# Patient Record
Sex: MALE | Race: WHITE | NOT HISPANIC OR LATINO | Employment: FULL TIME | URBAN - METROPOLITAN AREA
[De-identification: names, ages, dates, MRNs, and addresses within clinical notes are randomized per-mention and may not be internally consistent; named-entity substitution may affect disease eponyms.]

---

## 2020-12-29 ENCOUNTER — IMMUNIZATIONS (OUTPATIENT)
Dept: FAMILY MEDICINE CLINIC | Facility: HOSPITAL | Age: 65
End: 2020-12-29

## 2020-12-29 DIAGNOSIS — Z23 ENCOUNTER FOR IMMUNIZATION: ICD-10-CM

## 2020-12-29 PROCEDURE — 0011A SARS-COV-2 / COVID-19 MRNA VACCINE (MODERNA) 100 MCG: CPT

## 2020-12-29 PROCEDURE — 91301 SARS-COV-2 / COVID-19 MRNA VACCINE (MODERNA) 100 MCG: CPT

## 2021-01-26 ENCOUNTER — IMMUNIZATIONS (OUTPATIENT)
Dept: FAMILY MEDICINE CLINIC | Facility: HOSPITAL | Age: 66
End: 2021-01-26

## 2021-01-26 DIAGNOSIS — Z23 ENCOUNTER FOR IMMUNIZATION: Primary | ICD-10-CM

## 2021-01-26 PROCEDURE — 0012A SARS-COV-2 / COVID-19 MRNA VACCINE (MODERNA) 100 MCG: CPT

## 2021-01-26 PROCEDURE — 91301 SARS-COV-2 / COVID-19 MRNA VACCINE (MODERNA) 100 MCG: CPT

## 2023-02-07 ENCOUNTER — HOSPITAL ENCOUNTER (OUTPATIENT)
Facility: HOSPITAL | Age: 68
End: 2023-02-07
Attending: INTERNAL MEDICINE | Admitting: INTERNAL MEDICINE

## 2023-06-13 ENCOUNTER — TELEPHONE (OUTPATIENT)
Dept: GASTROENTEROLOGY | Facility: CLINIC | Age: 68
End: 2023-06-13

## 2023-06-13 NOTE — TELEPHONE ENCOUNTER
Patients GI provider:  New Pt    Number to return call: [phone]    Reason for call: Pt has [employer] PPO ID # [id number]  Was unable to verify      Scheduled procedure/appointment date if applicable: Apt 0/1/40

## 2023-06-30 RX ORDER — PAROXETINE 10 MG/1
TABLET, FILM COATED ORAL
COMMUNITY
Start: 2023-04-24

## 2023-06-30 RX ORDER — TESTOSTERONE ENANTHATE 100 MG/.5ML
INJECTION SUBCUTANEOUS
COMMUNITY
Start: 2023-06-07

## 2023-06-30 RX ORDER — APIXABAN 5 MG/1
5 TABLET, FILM COATED ORAL 2 TIMES DAILY
COMMUNITY
Start: 2023-04-08

## 2023-06-30 RX ORDER — ALLOPURINOL 100 MG/1
100 TABLET ORAL 2 TIMES DAILY
COMMUNITY
Start: 2023-04-17

## 2023-06-30 RX ORDER — NEBIVOLOL 5 MG/1
2.5 TABLET ORAL DAILY
COMMUNITY
Start: 2023-04-10

## 2023-06-30 RX ORDER — VALSARTAN 40 MG/1
40 TABLET ORAL 2 TIMES DAILY
COMMUNITY
Start: 2023-04-08

## 2023-06-30 RX ORDER — ATOMOXETINE HCL 60 MG
60 CAPSULE ORAL DAILY
COMMUNITY
Start: 2023-06-01

## 2023-06-30 RX ORDER — BECLOMETHASONE DIPROPIONATE HFA 80 UG/1
2 AEROSOL, METERED RESPIRATORY (INHALATION) DAILY
COMMUNITY
Start: 2023-04-17

## 2023-06-30 RX ORDER — MONTELUKAST SODIUM 10 MG/1
10 TABLET ORAL EVERY EVENING
COMMUNITY
Start: 2023-05-28

## 2023-07-03 ENCOUNTER — OFFICE VISIT (OUTPATIENT)
Dept: GASTROENTEROLOGY | Facility: CLINIC | Age: 68
End: 2023-07-03
Payer: COMMERCIAL

## 2023-07-03 ENCOUNTER — TELEPHONE (OUTPATIENT)
Dept: GASTROENTEROLOGY | Facility: CLINIC | Age: 68
End: 2023-07-03

## 2023-07-03 VITALS
DIASTOLIC BLOOD PRESSURE: 76 MMHG | HEIGHT: 76 IN | SYSTOLIC BLOOD PRESSURE: 118 MMHG | WEIGHT: 270 LBS | BODY MASS INDEX: 32.88 KG/M2

## 2023-07-03 DIAGNOSIS — Z86.010 HISTORY OF COLON POLYPS: Primary | ICD-10-CM

## 2023-07-03 DIAGNOSIS — R14.0 ABDOMINAL BLOATING: Primary | ICD-10-CM

## 2023-07-03 DIAGNOSIS — Z86.010 HISTORY OF COLON POLYPS: ICD-10-CM

## 2023-07-03 DIAGNOSIS — K59.00 CONSTIPATION, UNSPECIFIED CONSTIPATION TYPE: ICD-10-CM

## 2023-07-03 DIAGNOSIS — R10.12 LEFT UPPER QUADRANT ABDOMINAL PAIN: ICD-10-CM

## 2023-07-03 PROCEDURE — 99204 OFFICE O/P NEW MOD 45 MIN: CPT | Performed by: INTERNAL MEDICINE

## 2023-07-03 RX ORDER — SODIUM PICOSULFATE, MAGNESIUM OXIDE, AND ANHYDROUS CITRIC ACID 12; 3.5; 1 G/175ML; G/175ML; MG/175ML
LIQUID ORAL
Qty: 350 ML | Refills: 0 | Status: SHIPPED | OUTPATIENT
Start: 2023-07-03

## 2023-07-03 RX ORDER — POLYETHYLENE GLYCOL 3350, SODIUM SULFATE ANHYDROUS, SODIUM BICARBONATE, SODIUM CHLORIDE, POTASSIUM CHLORIDE 236; 22.74; 6.74; 5.86; 2.97 G/4L; G/4L; G/4L; G/4L; G/4L
4000 POWDER, FOR SOLUTION ORAL ONCE
Qty: 4000 ML | Refills: 0 | Status: SHIPPED | OUTPATIENT
Start: 2023-07-03 | End: 2023-07-03

## 2023-07-03 NOTE — TELEPHONE ENCOUNTER
Patient calling office in regards to the procedure prep. States that the [place] he had is  and is asking for a prescription to be sent to his pharmacy. no concerns

## 2023-07-03 NOTE — TELEPHONE ENCOUNTER
Scheduled date of colonoscopy (as of today):9/18/23  Physician performing colonoscopy:SAMSON  Location of colonoscopy:[facility]  Bowel prep reviewed with patient:Clenpiq  Instructions reviewed with patient by:JESSICA  Clearances: Angelo Burris

## 2023-07-03 NOTE — PROGRESS NOTES
[facility] - Outpatient Consultation  Felicitas Jones 74 y.o. male MRN: [id number]  Encounter: [id number]    ASSESSMENT AND PLAN:      1. Abdominal bloating  2. Left upper quadrant abdominal pain  New onset of early satiety and significant abdominal bloating concerned about peptic ulcer disease, gastritis, rule out H. pylori. Appears unrelated to any lower GI symptoms and doubt recurrence of underlying colitis. · Diet as tolerated  · Continue omeprazole  · Plan EGD  - EGD; Future    3. Constipation, unspecified constipation type  4. History of colon polyps  Patient has history of bloody stools and diagnosis of ulcerative colitis from teens through 25s. Appears to have resolved on its own. Has had periodic surveillance colonoscopies and adenomatous polyps have been found but no report of ulcerative colitis. Given change in bowels and history of colonic neoplasia we will plan colonoscopy at the time of his EGD.  - Colonoscopy; Future  - polyethylene glycol (Golytely) 4000 mL solution; Take 4,000 mL by mouth once for 1 dose Take 4000 mL by mouth once for 1 dose. Use as directed  Dispense: 4000 mL; Refill: 0      Followup Appointment: 3 to 4 months pending results of studies  ______________________________________________________________________    Chief Complaint   Patient presents with   • Bloated     Early satiety, full for long time, distention       HPI:   Felicitas Jones is a 74y.o. year old male who presents with bloatind, dyspepsia and change in stools. Diagnosed with UC as a teen, frequent blood stools. ?treated with Rx and resolved by 30years old? Has had surveillance colonoscopies, Has hx of polyps and 5 years surveillance. Has early satiety and bloating. LUQ pain. Not really early satiety, but feels uncomfortable and has bloating. Ongoing for six months. Also constipated past six months. Stools has mucus, no blood. No weight loss. No new medications. On Eliquis due to [place]. And DVT's diagnosed year and a half ago. Has reflux, no dysphagia.         Historical Information   Past Medical History:   Diagnosis Date   • AAA (abdominal aortic aneurysm) ([address])    • Asthma    • Diverticulitis of colon    • Hypertension    • Pulmonary embolism (HCC)    • Ulcerative colitis ([address])      Past Surgical History:   Procedure Laterality Date   • REPLACEMENT TOTAL KNEE Right    • TONSILLECTOMY     • TRIGGER FINGER RELEASE Bilateral      Social History     Substance and Sexual Activity   Alcohol Use Never     Social History     Substance and Sexual Activity   Drug Use Never     Social History     Tobacco Use   Smoking Status Former   • Types: Cigarettes   • Start date:    • Quit date:    • Years since quittin.5   Smokeless Tobacco Never     Family History   Problem Relation Age of Onset   • Colon polyps Mother    • Colon polyps Father    • Colon cancer Maternal Uncle    • Colon cancer Paternal Aunt        Meds/Allergies     Current Outpatient Medications:   •  allopurinol (ZYLOPRIM) 100 mg tablet  •  Eliquis 5 MG  •  montelukast (SINGULAIR) 10 mg tablet  •  nebivolol (BYSTOLIC) 5 mg tablet  •  PARoxetine (PAXIL) 10 mg tablet  •  polyethylene glycol (Golytely) 4000 mL solution  •  Qvar RediHaler 80 MCG/ACT inhaler  •  Strattera 60 MG capsule  •  valsartan (DIOVAN) 40 mg tablet  •  Xyosted 100 MG/0.5ML SOAJ    Allergies   Allergen Reactions   • Metronidazole Anaphylaxis and Rash     Severe SOB and CP     • Midazolam Anaphylaxis     Rapid irregular heart rate     • Penicillins Hives and Other (See Comments)   • Amoxicillin-Pot Clavulanate Hives   • Cephalexin Hives and Rash   • Iodinated Contrast Media Other (See Comments)   • Shellfish-Derivedproducts [Shellfish-Derived Products - Food Allergy] Other (See Comments)   • Sulfa Antibiotics Other (See Comments)   • Moxifloxacin Rash and Other (See Comments)       PHYSICAL EXAM:    Blood pressure 118/76, height 6' 4" (1.93 m), weight 122 kg (270 lb). Body mass index is 32.87 kg/m². General Appearance: NAD, cooperative, alert  Eyes: Anicteric, PERRLA, EOMI  ENT:  Normocephalic, atraumatic, normal mucosa. Neck:  Supple, symmetrical, trachea midline,   Resp:  Clear to auscultation bilaterally; no rales, rhonchi or wheezing; respirations unlabored   CV:  S1 S2, Regular rate and rhythm; no murmur, rub, or gallop. GI:  Soft, non-tender, non-distended; normal bowel sounds; no masses, no organomegaly   Rectal: Deferred  Musculoskeletal: No cyanosis, clubbing or edema. Normal ROM. Skin:  No jaundice, rashes, or lesions   Heme/Lymph: No palpable cervical lymphadenopathy  Psych: Normal affect, good eye contact  Neuro: No gross deficits, AAOx3    Lab Results:   No results found for: "WBC", "HGB", "HCT", "MCV", "PLT"  No results found for: "NA", "K", "CL", "CO2", "ANIONGAP", "BUN", "CREATININE", "GLUCOSE", "GLUF", "CALCIUM", "CORRECTEDCA", "AST", "ALT", "ALKPHOS", "PROT", "BILITOT", "EGFR"  No results found for: "IRON", "TIBC", "FERRITIN"  No results found for: "LIPASE"    Radiology Results:   No results found. REVIEW OF SYSTEMS:    CONSTITUTIONAL: Denies any fever, chills, rigors, and weight loss. HEENT: No earache or tinnitus. Denies hearing loss or visual disturbances. CARDIOVASCULAR: No chest pain or palpitations. RESPIRATORY: Denies any cough, hemoptysis, shortness of breath or dyspnea on exertion. GASTROINTESTINAL: As noted in the History of Present Illness. GENITOURINARY: No problems with urination. Denies any hematuria or dysuria. NEUROLOGIC: No dizziness or vertigo, denies headaches. MUSCULOSKELETAL: Denies any muscle or joint pain. SKIN: Denies skin rashes or itching. ENDOCRINE: Denies excessive thirst. Denies intolerance to heat or cold. PSYCHOSOCIAL: Denies depression or anxiety. Denies any recent memory loss.    Answers for HPI/ROS submitted by the patient on 7/1/2023  Chronicity: chronic  Onset: more than 1 month ago  Onset quality: gradual  Frequency: intermittently  Progression since onset: waxing and waning  Pain location: LLQ  Pain - numeric: 6/10  Pain quality: sharp  Radiates to: LUQ  anorexia: Yes  arthralgias: No  belching: Yes  constipation: Yes  diarrhea: No  dysuria: No  fever: No  flatus: Yes  frequency: No  headaches: No  hematochezia: No  hematuria: No  melena: No  myalgias: No  nausea:  No  weight loss: No  vomiting: No  Aggravated by: certain positions  Relieved by: palpation

## 2023-07-03 NOTE — LETTER
July 3, 2023     Jami Darby, [address]    Patient: Andrew Milan   YOB: 1955   Date of Visit: 7/3/2023       Dear Dr. Jocy Crowell: Thank you for referring Jamal Duran to me for evaluation. Below are my notes for this consultation. If you have questions, please do not hesitate to call me. I look forward to following your patient along with you. Sincerely,        Jane Bryant MD        CC: No Recipients    Jane Bryant MD  7/3/2023  5:53 PM  Sign when Signing Visit    [address] [facility] - Outpatient Consultation  Andrew Milna 74 y.o. male MRN: [id number]  Encounter: [id number]    ASSESSMENT AND PLAN:      1. Abdominal bloating  2. Left upper quadrant abdominal pain  New onset of early satiety and significant abdominal bloating concerned about peptic ulcer disease, gastritis, rule out H. pylori. Appears unrelated to any lower GI symptoms and doubt recurrence of underlying colitis. Diet as tolerated  Continue omeprazole  Plan EGD  - EGD; Future    3. Constipation, unspecified constipation type  4. History of colon polyps  Patient has history of bloody stools and diagnosis of ulcerative colitis from teens through 25s. Appears to have resolved on its own. Has had periodic surveillance colonoscopies and adenomatous polyps have been found but no report of ulcerative colitis. Given change in bowels and history of colonic neoplasia we will plan colonoscopy at the time of his EGD.  - Colonoscopy; Future  - polyethylene glycol (Golytely) 4000 mL solution; Take 4,000 mL by mouth once for 1 dose Take 4000 mL by mouth once for 1 dose.  Use as directed  Dispense: 4000 mL; Refill: 0      Followup Appointment: 3 to 4 months pending results of studies  ______________________________________________________________________    Chief Complaint   Patient presents with   • Bloated     Early satiety, full for long time, distention HPI:   Sruthi Garcia is a 74y.o. year old male who presents with bloatind, dyspepsia and change in stools. Diagnosed with UC as a teen, frequent blood stools. ?treated with Rx and resolved by 30years old? Has had surveillance colonoscopies, Has hx of polyps and 5 years surveillance. Has early satiety and bloating. LUQ pain. Not really early satiety, but feels uncomfortable and has bloating. Ongoing for six months. Also constipated past six months. Stools has mucus, no blood. No weight loss. No new medications. On Eliquis due to [place]. And DVT's diagnosed year and a half ago. Has reflux, no dysphagia.         Historical Information   Past Medical History:   Diagnosis Date   • AAA (abdominal aortic aneurysm) ([address])    • Asthma    • Diverticulitis of colon    • Hypertension    • Pulmonary embolism (HCC)    • Ulcerative colitis ([address])      Past Surgical History:   Procedure Laterality Date   • REPLACEMENT TOTAL KNEE Right    • TONSILLECTOMY     • TRIGGER FINGER RELEASE Bilateral      Social History     Substance and Sexual Activity   Alcohol Use Never     Social History     Substance and Sexual Activity   Drug Use Never     Social History     Tobacco Use   Smoking Status Former   • Types: Cigarettes   • Start date:    • Quit date:    • Years since quittin.5   Smokeless Tobacco Never     Family History   Problem Relation Age of Onset   • Colon polyps Mother    • Colon polyps Father    • Colon cancer Maternal Uncle    • Colon cancer Paternal Aunt        Meds/Allergies     Current Outpatient Medications:   •  allopurinol (ZYLOPRIM) 100 mg tablet  •  Eliquis 5 MG  •  montelukast (SINGULAIR) 10 mg tablet  •  nebivolol (BYSTOLIC) 5 mg tablet  •  PARoxetine (PAXIL) 10 mg tablet  •  polyethylene glycol (Golytely) 4000 mL solution  •  Qvar RediHaler 80 MCG/ACT inhaler  •  Strattera 60 MG capsule  •  valsartan (DIOVAN) 40 mg tablet  •  Xyosted 100 MG/0.5ML SOAJ    Allergies   Allergen Reactions • Metronidazole Anaphylaxis and Rash     Severe SOB and CP     • Midazolam Anaphylaxis     Rapid irregular heart rate     • Penicillins Hives and Other (See Comments)   • Amoxicillin-Pot Clavulanate Hives   • Cephalexin Hives and Rash   • Iodinated Contrast Media Other (See Comments)   • Shellfish-Derivedproducts [Shellfish-Derived Products - Food Allergy] Other (See Comments)   • Sulfa Antibiotics Other (See Comments)   • Moxifloxacin Rash and Other (See Comments)       PHYSICAL EXAM:    Blood pressure 118/76, height 6' 4" (1.93 m), weight 122 kg (270 lb). Body mass index is 32.87 kg/m². General Appearance: NAD, cooperative, alert  Eyes: Anicteric, PERRLA, EOMI  ENT:  Normocephalic, atraumatic, normal mucosa. Neck:  Supple, symmetrical, trachea midline,   Resp:  Clear to auscultation bilaterally; no rales, rhonchi or wheezing; respirations unlabored   CV:  S1 S2, Regular rate and rhythm; no murmur, rub, or gallop. GI:  Soft, non-tender, non-distended; normal bowel sounds; no masses, no organomegaly   Rectal: Deferred  Musculoskeletal: No cyanosis, clubbing or edema. Normal ROM. Skin:  No jaundice, rashes, or lesions   Heme/Lymph: No palpable cervical lymphadenopathy  Psych: Normal affect, good eye contact  Neuro: No gross deficits, AAOx3    Lab Results:   No results found for: "WBC", "HGB", "HCT", "MCV", "PLT"  No results found for: "NA", "K", "CL", "CO2", "ANIONGAP", "BUN", "CREATININE", "GLUCOSE", "GLUF", "CALCIUM", "CORRECTEDCA", "AST", "ALT", "ALKPHOS", "PROT", "BILITOT", "EGFR"  No results found for: "IRON", "TIBC", "FERRITIN"  No results found for: "LIPASE"    Radiology Results:   No results found. REVIEW OF SYSTEMS:    CONSTITUTIONAL: Denies any fever, chills, rigors, and weight loss. HEENT: No earache or tinnitus. Denies hearing loss or visual disturbances. CARDIOVASCULAR: No chest pain or palpitations.    RESPIRATORY: Denies any cough, hemoptysis, shortness of breath or dyspnea on exertion. GASTROINTESTINAL: As noted in the History of Present Illness. GENITOURINARY: No problems with urination. Denies any hematuria or dysuria. NEUROLOGIC: No dizziness or vertigo, denies headaches. MUSCULOSKELETAL: Denies any muscle or joint pain. SKIN: Denies skin rashes or itching. ENDOCRINE: Denies excessive thirst. Denies intolerance to heat or cold. PSYCHOSOCIAL: Denies depression or anxiety. Denies any recent memory loss. Answers for HPI/ROS submitted by the patient on 7/1/2023  Chronicity: chronic  Onset: more than 1 month ago  Onset quality: gradual  Frequency: intermittently  Progression since onset: waxing and waning  Pain location: LLQ  Pain - numeric: 6/10  Pain quality: sharp  Radiates to: LUQ  anorexia: Yes  arthralgias: No  belching: Yes  constipation: Yes  diarrhea: No  dysuria: No  fever: No  flatus: Yes  frequency: No  headaches: No  hematochezia: No  hematuria: No  melena: No  myalgias: No  nausea:  No  weight loss: No  vomiting: No  Aggravated by: certain positions  Relieved by: palpation

## 2023-07-06 ENCOUNTER — TELEPHONE (OUTPATIENT)
Age: 68
End: 2023-07-06

## 2023-07-06 NOTE — TELEPHONE ENCOUNTER
Scheduled date of EGD/colonoscopy (as of today):09/20/2023  Physician performing EGD/colonoscopy:Dr Umm Duffy  Location of EGD/colonoscopy:[facility]  Clearances: n/a

## 2023-08-21 ENCOUNTER — TELEPHONE (OUTPATIENT)
Dept: GASTROENTEROLOGY | Facility: CLINIC | Age: 68
End: 2023-08-21

## 2023-08-21 NOTE — TELEPHONE ENCOUNTER
Message left for patient to call back to confirm who prescribes his Eliquis. Hematology or cardiology?

## 2023-09-01 ENCOUNTER — TELEPHONE (OUTPATIENT)
Dept: GASTROENTEROLOGY | Facility: CLINIC | Age: 68
End: 2023-09-01

## 2023-09-01 ENCOUNTER — PATIENT MESSAGE (OUTPATIENT)
Dept: GASTROENTEROLOGY | Facility: CLINIC | Age: 68
End: 2023-09-01

## 2023-09-01 NOTE — TELEPHONE ENCOUNTER
Procedure confirmed  Colonoscopy/Endoscopy     Via: Spoke with patient. Instructions given: Given to Patient at Visit     Prep Given: Clenpiq    Call the office if there are any questions. Patient advised of receipt of clearance for Eliquis hold. His las dose will bel 9/17/2023. Patient verbalized understanding.

## 2023-09-19 ENCOUNTER — NURSE TRIAGE (OUTPATIENT)
Age: 68
End: 2023-09-19

## 2023-09-19 NOTE — TELEPHONE ENCOUNTER
----- Message from Javed Cortes sent at 9/19/2023  3:29 PM EDT -----  Pt requested to speak to a nurse in regards to his night time meds for his heart. Pt is scheduled for his procedure for tomorrow and would like to know if he will be ok taking his night time meds along with the clenpiq.

## 2023-09-19 NOTE — TELEPHONE ENCOUNTER
I spoke with patient and reviewed that he can take his regular medications as ordered. I reviewed that if any in the morning he needs to take 2 hours prior to procedure with sips of water. He states he takes later in day so will hold until afterwards. I did ask if he had emergency inhaler on hand since Qvar is listed on meds and he confirmed he does have Xopenex HFA to use prn for asthma. I asked that he take inhaler with him to his procedure appointment. He confirmed he has held Eliquis as instructed. Dr. Neo Espino performing procedure.

## 2023-09-20 ENCOUNTER — ANESTHESIA EVENT (OUTPATIENT)
Dept: GASTROENTEROLOGY | Facility: AMBULATORY SURGERY CENTER | Age: 68
End: 2023-09-20

## 2023-09-20 ENCOUNTER — HOSPITAL ENCOUNTER (OUTPATIENT)
Dept: GASTROENTEROLOGY | Facility: AMBULATORY SURGERY CENTER | Age: 68
Discharge: HOME | End: 2023-09-20
Payer: COMMERCIAL

## 2023-09-20 ENCOUNTER — ANESTHESIA (OUTPATIENT)
Dept: GASTROENTEROLOGY | Facility: AMBULATORY SURGERY CENTER | Age: 68
End: 2023-09-20

## 2023-09-20 VITALS
DIASTOLIC BLOOD PRESSURE: 77 MMHG | SYSTOLIC BLOOD PRESSURE: 140 MMHG | RESPIRATION RATE: 20 BRPM | WEIGHT: 267 LBS | HEART RATE: 81 BPM | OXYGEN SATURATION: 96 % | BODY MASS INDEX: 32.51 KG/M2 | TEMPERATURE: 97.6 F | HEIGHT: 76 IN

## 2023-09-20 DIAGNOSIS — K59.00 CONSTIPATION, UNSPECIFIED CONSTIPATION TYPE: ICD-10-CM

## 2023-09-20 DIAGNOSIS — R10.12 LEFT UPPER QUADRANT ABDOMINAL PAIN: ICD-10-CM

## 2023-09-20 DIAGNOSIS — R14.0 ABDOMINAL BLOATING: ICD-10-CM

## 2023-09-20 DIAGNOSIS — Z86.010 HISTORY OF COLON POLYPS: ICD-10-CM

## 2023-09-20 PROCEDURE — 43239 EGD BIOPSY SINGLE/MULTIPLE: CPT | Performed by: INTERNAL MEDICINE

## 2023-09-20 PROCEDURE — 88305 TISSUE EXAM BY PATHOLOGIST: CPT | Performed by: PATHOLOGY

## 2023-09-20 PROCEDURE — 45380 COLONOSCOPY AND BIOPSY: CPT | Performed by: INTERNAL MEDICINE

## 2023-09-20 RX ORDER — SODIUM CHLORIDE 9 MG/ML
50 INJECTION, SOLUTION INTRAVENOUS CONTINUOUS
Status: DISCONTINUED | OUTPATIENT
Start: 2023-09-20 | End: 2023-09-24 | Stop reason: HOSPADM

## 2023-09-20 RX ORDER — PROPOFOL 10 MG/ML
INJECTION, EMULSION INTRAVENOUS AS NEEDED
Status: DISCONTINUED | OUTPATIENT
Start: 2023-09-20 | End: 2023-09-20

## 2023-09-20 RX ADMIN — PROPOFOL 50 MG: 10 INJECTION, EMULSION INTRAVENOUS at 08:53

## 2023-09-20 RX ADMIN — PROPOFOL 50 MG: 10 INJECTION, EMULSION INTRAVENOUS at 08:50

## 2023-09-20 RX ADMIN — SODIUM CHLORIDE 50 ML/HR: 9 INJECTION, SOLUTION INTRAVENOUS at 08:39

## 2023-09-20 RX ADMIN — PROPOFOL 70 MG: 10 INJECTION, EMULSION INTRAVENOUS at 08:47

## 2023-09-20 RX ADMIN — PROPOFOL 130 MG: 10 INJECTION, EMULSION INTRAVENOUS at 08:44

## 2023-09-20 RX ADMIN — PROPOFOL 50 MG: 10 INJECTION, EMULSION INTRAVENOUS at 08:58

## 2023-09-20 RX ADMIN — PROPOFOL 50 MG: 10 INJECTION, EMULSION INTRAVENOUS at 09:03

## 2023-09-20 NOTE — H&P
History and Physical - [facility]  Joaquin Elaine 74 y.o. male MRN: [id number]    HPI: Joaquin Elaine is a 74y.o. year old male who presents for EGD and colonoscopy secondary to upper abdominal pain and personal history of colon polyps    REVIEW OF SYSTEMS: Per the HPI, and otherwise unremarkable.     Historical Information   Past Medical History:   Diagnosis Date   • AAA (abdominal aortic aneurysm) ([address])    • Asthma    • Colon polyp    • Diverticulitis of colon    • Hypertension    • Pulmonary embolism (HCC)    • Ulcerative colitis ([address])    • Ventricular hypertrophy     left     Past Surgical History:   Procedure Laterality Date   • REPLACEMENT TOTAL KNEE Right    • TONSILLECTOMY     • TRIGGER FINGER RELEASE Bilateral      Social History   Social History     Substance and Sexual Activity   Alcohol Use Not Currently    Comment: rarely     Social History     Substance and Sexual Activity   Drug Use Never     Social History     Tobacco Use   Smoking Status Former   • Types: Cigarettes   • Start date:    • Quit date:    • Years since quittin.7   Smokeless Tobacco Never     Family History   Problem Relation Age of Onset   • Colon polyps Mother    • Colon polyps Father    • Colon cancer Maternal Uncle    • Colon cancer Paternal Aunt        Meds/Allergies       Current Outpatient Medications:   •  allopurinol (ZYLOPRIM) 100 mg tablet  •  Eliquis 5 MG  •  montelukast (SINGULAIR) 10 mg tablet  •  nebivolol (BYSTOLIC) 5 mg tablet  •  PARoxetine (PAXIL) 10 mg tablet  •  Qvar RediHaler 80 MCG/ACT inhaler  •  sodium picosulfate, magnesium oxide, citric acid (Clenpiq) oral solution  •  Strattera 60 MG capsule  •  valsartan (DIOVAN) 40 mg tablet  •  Xyosted 100 MG/0.5ML SOAJ  •  polyethylene glycol (Golytely) 4000 mL solution    Current Facility-Administered Medications:   •  sodium chloride 0.9 % infusion, 50 mL/hr, Intravenous, Continuous    Allergies   Allergen Reactions   • Metronidazole Anaphylaxis and Rash     Severe SOB and CP     • Midazolam Anaphylaxis     Rapid irregular heart rate     • Penicillins Hives and Other (See Comments)   • Amoxicillin-Pot Clavulanate Hives   • Cephalexin Hives and Rash   • Iodinated Contrast Media Other (See Comments)   • Shellfish-Derivedproducts [Shellfish-Derived Products - Food Allergy] Other (See Comments)   • Sulfa Antibiotics Other (See Comments)   • Moxifloxacin Rash and Other (See Comments)       Objective     /90   Pulse 80   Temp 97.6 °F (36.4 °C) (Temporal)   Resp 22   Ht 6' 4" (1.93 m)   Wt 121 kg (267 lb)   SpO2 96%   BMI 32.50 kg/m²     PHYSICAL EXAM    Gen: NAD AAOx3  Head: Normocephalic, Atraumatic  CV: S1S2 RRR no m/r/g  CHEST: Clear b/l no c/r/w  ABD: soft, +BS NT/ND  EXT: no edema    ASSESSMENT/PLAN:  This is a 74y.o. year old male here for EGD and colonoscopy secondary to upper abdominal pain and personal history of polyps, and he is stable and optimized for his procedure.

## 2023-09-20 NOTE — ANESTHESIA PREPROCEDURE EVALUATION
Procedure:  COLONOSCOPY  EGD    Relevant Problems   No relevant active problems      Class II obesity  HTN  Asthma  Ulcerative colitis  Prior history of PE (on eliquis) - last had it 2 days ago  Physical Exam    Airway    Mallampati score: II  TM Distance: >3 FB  Neck ROM: full     Dental   Comment: None loose, No notable dental hx     Cardiovascular      Pulmonary      Other Findings        Anesthesia Plan  ASA Score- 2     Anesthesia Type- IV sedation with anesthesia with ASA Monitors. Additional Monitors:   Airway Plan:     Comment: Last of PO bowel prep: 04:00     Patient educated on the possibility for awareness under sedation and of the possibility of airway intervention in the event of an airway or procedural emergency  . Plan Factors-Exercise tolerance (METS): >4 METS. Chart reviewed. Patient summary reviewed. Patient is not a current smoker. Induction- intravenous. Postoperative Plan-     Informed Consent- Anesthetic plan and risks discussed with patient. I personally reviewed this patient with the CRNA. Discussed and agreed on the Anesthesia Plan with the CRNA. Kristian Wyman

## 2023-09-20 NOTE — ANESTHESIA POSTPROCEDURE EVALUATION
Post-Op Assessment Note    CV Status:  Stable  Pain Score: 0    Pain management: adequate     Mental Status:  Sleepy   Hydration Status:  Euvolemic   PONV Controlled:  Controlled   Airway Patency:  Patent      Post Op Vitals Reviewed: Yes      Staff: Anesthesiologist, CRNA         No notable events documented.     BP   119/71   Temp      Pulse  78   Resp   18   SpO2   93

## 2023-09-21 ENCOUNTER — TELEPHONE (OUTPATIENT)
Dept: GASTROENTEROLOGY | Facility: CLINIC | Age: 68
End: 2023-09-21

## 2023-09-25 PROCEDURE — 88305 TISSUE EXAM BY PATHOLOGIST: CPT | Performed by: PATHOLOGY
